# Patient Record
Sex: FEMALE | Race: WHITE | Employment: FULL TIME | URBAN - METROPOLITAN AREA
[De-identification: names, ages, dates, MRNs, and addresses within clinical notes are randomized per-mention and may not be internally consistent; named-entity substitution may affect disease eponyms.]

---

## 2019-01-22 ENCOUNTER — OFFICE VISIT (OUTPATIENT)
Dept: URGENT CARE | Facility: URGENT CARE | Age: 34
End: 2019-01-22
Payer: COMMERCIAL

## 2019-01-22 VITALS
BODY MASS INDEX: 22.19 KG/M2 | HEIGHT: 70 IN | SYSTOLIC BLOOD PRESSURE: 133 MMHG | WEIGHT: 155 LBS | TEMPERATURE: 97.8 F | DIASTOLIC BLOOD PRESSURE: 80 MMHG | OXYGEN SATURATION: 100 % | HEART RATE: 69 BPM

## 2019-01-22 DIAGNOSIS — N61.0 ACUTE MASTITIS OF RIGHT BREAST: Primary | ICD-10-CM

## 2019-01-22 PROCEDURE — 99203 OFFICE O/P NEW LOW 30 MIN: CPT | Performed by: INTERNAL MEDICINE

## 2019-01-22 RX ORDER — CEFDINIR 300 MG/1
300 CAPSULE ORAL 2 TIMES DAILY
Qty: 20 CAPSULE | Refills: 0 | Status: SHIPPED | OUTPATIENT
Start: 2019-01-22 | End: 2019-02-01

## 2019-01-22 ASSESSMENT — MIFFLIN-ST. JEOR: SCORE: 1498.33

## 2019-01-22 ASSESSMENT — ENCOUNTER SYMPTOMS: FEVER: 0

## 2019-01-23 NOTE — PATIENT INSTRUCTIONS
omnicef 2 x day for 10 days  Probiotics  Warm & cold compress  Massage  breast feeding & pumping    If fever needs recheck.    Recheck if not improved in 2 days  Discussed symptoms of concern that would recommend ER evaluation

## 2019-01-23 NOTE — PROGRESS NOTES
"SUBJECTIVE:   Luz Hawkins is a 33 year old female presenting with a chief complaint of   Chief Complaint   Patient presents with     Urgent Care     Breast Pain     c/o breast pain for 1 day       She is a new patient of [facility].    breast feeding   Onset of symptoms was today    Location: right breast   Context: breast feeding 5 month daughter  Sleeping more through night past few nights  Did not wake to pump.  Current and Associated symptoms: redness, warmth, swelling, pain and lump  Has had clogged ducts  Treatment measures tried include: breast feeding/pumping  Relief from treatment: none      Review of Systems   Constitutional: Negative for fever.     On zyrtec & prilosec    Past Medical History:   Diagnosis Date     Acid reflux      Seasonal allergies      No family history on file.  Current Outpatient Medications   Medication Sig Dispense Refill     cefdinir (OMNICEF) 300 MG capsule Take 1 capsule (300 mg) by mouth 2 times daily for 10 days 20 capsule 0     Social History     Tobacco Use     Smoking status: Never Smoker     Smokeless tobacco: Never Used   Substance Use Topics     Alcohol use: Not on file       OBJECTIVE  /80   Pulse 69   Temp 97.8  F (36.6  C) (Tympanic)   Ht 1.778 m (5' 10\")   Wt 70.3 kg (155 lb)   SpO2 100%   BMI 22.24 kg/m      Physical Exam   Constitutional: She appears well-developed and well-nourished.   Skin:   Breast exam  right breast 7 x 10 cm firmness medial upper quadrant  Redness noted within the firm area with swelling & tenderness    Area outlined.    left breast - normal, soft   Vitals reviewed.      Labs:  No results found for this or any previous visit (from the past 24 hour(s)).      ASSESSMENT:      ICD-10-CM    1. Acute mastitis of right breast N61.0 cefdinir (OMNICEF) 300 MG capsule        PLAN:      Patient Instructions   omnicef 2 x day for 10 days  Probiotics  Warm & cold compress  Massage  breast feeding & pumping    If fever needs recheck.    Recheck " if not improved in 2 days  Discussed symptoms of concern that would recommend ER evaluation

## 2021-03-22 ENCOUNTER — IMMUNIZATION (OUTPATIENT)
Dept: NURSING | Facility: CLINIC | Age: 36
End: 2021-03-22
Payer: OTHER GOVERNMENT

## 2021-03-22 PROCEDURE — 0001A PR COVID VAC PFIZER DIL RECON 30 MCG/0.3 ML IM: CPT

## 2021-03-22 PROCEDURE — 91300 PR COVID VAC PFIZER DIL RECON 30 MCG/0.3 ML IM: CPT

## 2021-04-12 ENCOUNTER — IMMUNIZATION (OUTPATIENT)
Dept: NURSING | Facility: CLINIC | Age: 36
End: 2021-04-12
Attending: INTERNAL MEDICINE
Payer: OTHER GOVERNMENT

## 2021-04-12 PROCEDURE — 91300 PR COVID VAC PFIZER DIL RECON 30 MCG/0.3 ML IM: CPT

## 2021-04-12 PROCEDURE — 0002A PR COVID VAC PFIZER DIL RECON 30 MCG/0.3 ML IM: CPT

## 2021-04-25 ENCOUNTER — HEALTH MAINTENANCE LETTER (OUTPATIENT)
Age: 36
End: 2021-04-25

## 2021-10-10 ENCOUNTER — HEALTH MAINTENANCE LETTER (OUTPATIENT)
Age: 36
End: 2021-10-10

## 2022-05-21 ENCOUNTER — HEALTH MAINTENANCE LETTER (OUTPATIENT)
Age: 37
End: 2022-05-21

## 2022-09-18 ENCOUNTER — HEALTH MAINTENANCE LETTER (OUTPATIENT)
Age: 37
End: 2022-09-18

## 2023-06-04 ENCOUNTER — HEALTH MAINTENANCE LETTER (OUTPATIENT)
Age: 38
End: 2023-06-04